# Patient Record
Sex: FEMALE | Race: WHITE | NOT HISPANIC OR LATINO | URBAN - METROPOLITAN AREA
[De-identification: names, ages, dates, MRNs, and addresses within clinical notes are randomized per-mention and may not be internally consistent; named-entity substitution may affect disease eponyms.]

---

## 2021-08-02 ENCOUNTER — OFFICE VISIT (OUTPATIENT)
Dept: URBAN - METROPOLITAN AREA CLINIC 76 | Facility: CLINIC | Age: 81
End: 2021-08-02
Payer: MEDICARE

## 2021-08-02 DIAGNOSIS — H10.45 OTHER CHRONIC ALLERGIC CONJUNCTIVITIS: Primary | ICD-10-CM

## 2021-08-02 DIAGNOSIS — G43.101 MIGRAINE WITH AURA, NOT INTRACTABLE, WITH STATUS MIGRAINOSUS: ICD-10-CM

## 2021-08-02 DIAGNOSIS — H04.123 DRY EYE SYNDROME OF BILATERAL LACRIMAL GLANDS: ICD-10-CM

## 2021-08-02 PROCEDURE — 99203 OFFICE O/P NEW LOW 30 MIN: CPT | Performed by: OPTOMETRIST

## 2021-08-02 ASSESSMENT — INTRAOCULAR PRESSURE
OD: 13
OS: 15

## 2021-08-02 NOTE — IMPRESSION/PLAN
Impression: Migraine with aura, not intractable, with status migrainosus: G43.101. Bilateral. Hx of migraines x10yrs. Plan: Discussed diagnosis with patient. Recommend decrease caffeine, stress, and dark chocolate intake. Consider antihistamine orally if having allergic sinus issues.

## 2021-08-12 ENCOUNTER — OFFICE VISIT (OUTPATIENT)
Dept: URBAN - METROPOLITAN AREA CLINIC 76 | Facility: CLINIC | Age: 81
End: 2021-08-12
Payer: MEDICARE

## 2021-08-12 DIAGNOSIS — Z96.1 PRESENCE OF INTRAOCULAR LENS: ICD-10-CM

## 2021-08-12 DIAGNOSIS — H02.889 MEIBOMIAN GLAND DYSFUNCTION OF UNSPECIFIED EYE, UNSPECIFIED EYELID: Primary | ICD-10-CM

## 2021-08-12 PROCEDURE — 99213 OFFICE O/P EST LOW 20 MIN: CPT | Performed by: OPTOMETRIST

## 2021-08-12 ASSESSMENT — KERATOMETRY
OS: 44.88
OD: 45.25

## 2021-08-12 NOTE — IMPRESSION/PLAN
Impression: Meibomian gland dysfunction of unspecified eye, unspecified eyelid: H02.889. Bilateral. Plan: Discussed diagnosis with patient. Start Zylet TID OU until bottle is gone (sample given) Recommend OTC oral antihistamine, and Ketotifen 1 drop bid ou, prn. Rub excess into lids. Recommend refrigerating drops.